# Patient Record
Sex: MALE | Race: WHITE | NOT HISPANIC OR LATINO | Employment: FULL TIME | URBAN - METROPOLITAN AREA
[De-identification: names, ages, dates, MRNs, and addresses within clinical notes are randomized per-mention and may not be internally consistent; named-entity substitution may affect disease eponyms.]

---

## 2018-02-06 ENCOUNTER — OFFICE VISIT (OUTPATIENT)
Dept: OBGYN CLINIC | Facility: MEDICAL CENTER | Age: 43
End: 2018-02-06
Payer: COMMERCIAL

## 2018-02-06 VITALS
BODY MASS INDEX: 23.79 KG/M2 | DIASTOLIC BLOOD PRESSURE: 89 MMHG | HEIGHT: 68 IN | WEIGHT: 157 LBS | SYSTOLIC BLOOD PRESSURE: 139 MMHG | HEART RATE: 88 BPM

## 2018-02-06 DIAGNOSIS — M25.511 RIGHT SHOULDER PAIN, UNSPECIFIED CHRONICITY: ICD-10-CM

## 2018-02-06 DIAGNOSIS — M54.12 RADICULOPATHY, CERVICAL REGION: Primary | ICD-10-CM

## 2018-02-06 PROCEDURE — 99203 OFFICE O/P NEW LOW 30 MIN: CPT | Performed by: ORTHOPAEDIC SURGERY

## 2018-02-06 RX ORDER — OMEPRAZOLE 40 MG/1
40 CAPSULE, DELAYED RELEASE ORAL DAILY
Refills: 5 | COMMUNITY
Start: 2018-01-23

## 2018-02-06 NOTE — PROGRESS NOTES
CHIEF COMPLAINT:   Chief Complaint   Patient presents with    Shoulder Pain       HISTORY: Leticia Davis is a 40 y o  male here for initial evaluation of right arm pain  He reports 1 year pain in the right arm  He reports a remote trauma 2 and half years ago but the pain from this injury resolved  However 1 year ago he woke up in the morning and was unable to lift his arm  He complains of pain in the scapular region and lateral arm as well as numbness and tingling in the lateral upper arm  No recent trauma  He was initially evaluated by [facility] where he was prescribed tramadol and recommended for 1 month follow-up but he did not return  Today he complains of continued pain in the scapular and lateral arm region with numbness and tingling in the lateral upper arm  ROS: Other than what is noted in the history of present illness 12 point review of systems was obtained and was otherwise negative       PROBLEMS:   Patient Active Problem List   Diagnosis    Radiculopathy, cervical region    Pain in right shoulder       MEDS:   Current Outpatient Prescriptions   Medication Sig Dispense Refill    omeprazole (PriLOSEC) 40 MG capsule Take 40 mg by mouth daily  5     No current facility-administered medications for this visit  ALLERGIES: Patient has no known allergies  MEDICAL HISTORY:   No past medical history on file  SURGICAL HISTORY:   No past surgical history on file  FAMILY HISTORY:   No family history on file  SOCIAL:   Social History     Social History    Marital status: /Civil Union     Spouse name: N/A    Number of children: N/A    Years of education: N/A     Occupational History    Not on file       Social History Main Topics    Smoking status: Not on file    Smokeless tobacco: Not on file    Alcohol use Not on file    Drug use: Unknown    Sexual activity: Not on file     Other Topics Concern    Not on file     Social History Narrative    No narrative on file The medications, allergies, and history as listed above were all reviewed by myself during this encounter      PHYSICAL EXAM:  Vitals:   Vitals:    02/06/18 1216   BP: 139/89   BP Location: Left arm   Patient Position: Sitting   Cuff Size: Standard   Pulse: 88   Weight: 71 2 kg (157 lb)   Height: 5' 8" (1 727 m)     Body mass index is 23 87 kg/m²  General:  Alert, no distress    ORTHO EXAM:  Neck:  No midline tenderness  Neck range of motion is full with minimal discomfort  Neurologic:  There are paresthesias in the C5 distribution on the right  Sensation is intact to light touch in C6 through T1 on the right and C5 through T1 on the left  Strength is 5/5 in C5 through T1 bilaterally  Right shoulder skin is intact  No tenderness  There is atrophy in the supraspinatus fossa  Active forward elevation is 170°, external rotation is 75°, internal rotation is to T10  Negative Neer and Helms impingement signs  Strength is 5/5 and elevation, supraspinatus, internal rotation, external rotation  Arm is warm and well perfused  IMAGING:  Three views the right shoulder and x-rays Of the cervical spine were available for review  These are available in the PACS system under patient id number [id number]  There is loss of normal cervical lordosis on the cervical spine  There are no acute bony abnormalities on either the shoulder or the cervical spine      ASSESSMENT AND PLAN:  Concern for cervical radiculopathy given the numbness and tingling as well as muscle atrophy  Recommend MRI of the cervical spine  However also given history of remote trauma with recommend MRI of the shoulder as well to rule out rotator cuff tear  Della Pam was seen today for shoulder pain      Diagnoses and all orders for this visit:    Radiculopathy, cervical region  -     MRI cervical spine wo contrast; Future    Right shoulder pain, unspecified chronicity  -     MRI shoulder right wo contrast; Future        There are no Patient Instructions on file for this visit  Return for After Tests Complete - Review results

## 2018-02-10 ENCOUNTER — HOSPITAL ENCOUNTER (OUTPATIENT)
Dept: MRI IMAGING | Facility: HOSPITAL | Age: 43
Discharge: HOME | End: 2018-02-10
Attending: ORTHOPAEDIC SURGERY
Payer: COMMERCIAL

## 2018-02-10 ENCOUNTER — APPOINTMENT (OUTPATIENT)
Dept: MRI IMAGING | Facility: HOSPITAL | Age: 43
End: 2018-02-10
Attending: ORTHOPAEDIC SURGERY
Payer: COMMERCIAL

## 2018-02-10 DIAGNOSIS — M54.12 RADICULOPATHY, CERVICAL REGION: ICD-10-CM

## 2018-02-10 PROCEDURE — 72141 MRI NECK SPINE W/O DYE: CPT

## 2018-02-16 ENCOUNTER — OFFICE VISIT (OUTPATIENT)
Dept: OBGYN CLINIC | Facility: CLINIC | Age: 43
End: 2018-02-16
Payer: COMMERCIAL

## 2018-02-16 VITALS
SYSTOLIC BLOOD PRESSURE: 131 MMHG | DIASTOLIC BLOOD PRESSURE: 86 MMHG | WEIGHT: 162 LBS | HEIGHT: 68 IN | BODY MASS INDEX: 24.55 KG/M2 | HEART RATE: 81 BPM

## 2018-02-16 DIAGNOSIS — M54.12 RADICULOPATHY, CERVICAL REGION: Primary | ICD-10-CM

## 2018-02-16 DIAGNOSIS — M25.511 RIGHT SHOULDER PAIN, UNSPECIFIED CHRONICITY: ICD-10-CM

## 2018-02-16 PROCEDURE — 99214 OFFICE O/P EST MOD 30 MIN: CPT | Performed by: ORTHOPAEDIC SURGERY

## 2018-02-16 NOTE — PATIENT INSTRUCTIONS
MRI shows a pinched nerve in the neck which is likely causing the pain, numbness, and muscle atrophy  Recommend referral to pain management for further management of the neck  We will also start physical therapy for the shoulder

## 2018-02-16 NOTE — PROGRESS NOTES
CHIEF COMPLAINT:   Chief Complaint   Patient presents with    Right Shoulder - Follow-up       HISTORY: Sree Mora is a 40 y o  male here for follow-up of right arm pain and MRI results  He continues to complain of pain in the right upper arm as well as numbness in the lateral aspect of the upper arm  No new injuries  ROS: Other than what is noted in the history of present illness 12 point review of systems was obtained and was otherwise negative      PROBLEMS:   Patient Active Problem List   Diagnosis    Radiculopathy, cervical region    Pain in right shoulder       MEDS:   Current Outpatient Prescriptions   Medication Sig Dispense Refill    omeprazole (PriLOSEC) 40 MG capsule Take 40 mg by mouth daily  5     No current facility-administered medications for this visit  ALLERGIES: Patient has no known allergies  MEDICAL HISTORY:   No past medical history on file  SOCIAL:   Social History     Social History    Marital status: /Civil Union     Spouse name: N/A    Number of children: N/A    Years of education: N/A     Occupational History    Not on file  Social History Main Topics    Smoking status: Not on file    Smokeless tobacco: Not on file    Alcohol use Not on file    Drug use: Unknown    Sexual activity: Not on file     Other Topics Concern    Not on file     Social History Narrative    No narrative on file       The medications, allergies, and history as listed above were all reviewed by myself during this encounter      PHYSICAL EXAM:  Vitals:   Vitals:    02/16/18 1529   BP: 131/86   Pulse: 81   Weight: 73 5 kg (162 lb)   Height: 5' 8" (1 727 m)     Body mass index is 24 63 kg/m²  General:  Alert, no distress    ORTHO EXAM:   Neurologic:  There are paresthesias in the C5 distribution on the right  Sensation is intact to light touch in C6 through T1 on the right and C5 through T1 on the left  Strength is 5/5 in C5 through T1 bilaterally    Right shoulder skin is intact  No tenderness  There is atrophy in the supraspinatus fossa  Active forward elevation is 170°, external rotation is 75°, internal rotation is to T10  Negative Neer and Helms impingement signs  Strength is 5/5 and elevation, supraspinatus, internal rotation, external rotation  Arm is warm and well perfused  IMAGING:  MRI was reviewed including imaging and report  Findings are as follows     IMPRESSION:      C4-5 and C5-6 right paracentral disc protrusions  Right lateral canal stenosis at both levels, more pronounced at the C4-5 level  Correlate for corresponding right-sided radiculopathy       No abnormal cord signal       ASSESSMENT AND PLAN:  Adelfa Lanes was seen today for follow-up  Diagnoses and all orders for this visit:    Radiculopathy, cervical region  -     Ambulatory referral to Pain Management; Future    Right shoulder pain, unspecified chronicity  -     Ambulatory referral to Physical Therapy; Future        Patient Instructions   MRI shows a pinched nerve in the neck which is likely causing the pain, numbness, and muscle atrophy  Recommend referral to pain management for further management of the neck  We will also start physical therapy for the shoulder  Return in about 5 weeks (around 3/23/2018)

## 2018-02-27 ENCOUNTER — EVALUATION (OUTPATIENT)
Dept: PHYSICAL THERAPY | Facility: MEDICAL CENTER | Age: 43
End: 2018-02-27
Payer: COMMERCIAL

## 2018-02-27 DIAGNOSIS — M54.12 RADICULOPATHY, CERVICAL REGION: Primary | ICD-10-CM

## 2018-02-27 DIAGNOSIS — M25.511 RIGHT SHOULDER PAIN, UNSPECIFIED CHRONICITY: ICD-10-CM

## 2018-02-27 PROCEDURE — 97161 PT EVAL LOW COMPLEX 20 MIN: CPT | Performed by: PHYSICAL THERAPIST

## 2018-02-27 PROCEDURE — G8990 OTHER PT/OT CURRENT STATUS: HCPCS | Performed by: PHYSICAL THERAPIST

## 2018-02-27 PROCEDURE — G8991 OTHER PT/OT GOAL STATUS: HCPCS | Performed by: PHYSICAL THERAPIST
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
## 2018-03-08 ENCOUNTER — OFFICE VISIT (OUTPATIENT)
Dept: PHYSICAL THERAPY | Facility: MEDICAL CENTER | Age: 43
End: 2018-03-08
Payer: COMMERCIAL

## 2018-03-08 DIAGNOSIS — M54.12 RADICULOPATHY, CERVICAL REGION: Primary | ICD-10-CM

## 2018-03-08 DIAGNOSIS — M25.511 RIGHT SHOULDER PAIN, UNSPECIFIED CHRONICITY: ICD-10-CM

## 2018-03-08 PROCEDURE — G8991 OTHER PT/OT GOAL STATUS: HCPCS | Performed by: PHYSICAL THERAPIST

## 2018-03-08 PROCEDURE — 97110 THERAPEUTIC EXERCISES: CPT | Performed by: PHYSICAL THERAPIST

## 2018-03-08 PROCEDURE — 97140 MANUAL THERAPY 1/> REGIONS: CPT | Performed by: PHYSICAL THERAPIST

## 2018-03-08 PROCEDURE — G8992 OTHER PT/OT  D/C STATUS: HCPCS | Performed by: PHYSICAL THERAPIST

## 2018-03-08 PROCEDURE — 97112 NEUROMUSCULAR REEDUCATION: CPT | Performed by: PHYSICAL THERAPIST

## 2018-03-08 NOTE — PROGRESS NOTES
Daily Note     Today's date: 3/8/2018  Patient name: Hakan Mccloud  : 1975  MRN: [id number]  Referring provider: Priyanka Pearson MD  Dx:   Encounter Diagnosis     ICD-10-CM    1  Radiculopathy, cervical region M54 12    2  Right shoulder pain, unspecified chronicity M25 511                   Subjective: Patient reports exercises are helping decrease pain and numbness  Objective: See treatment diary below    Precautions cervical disc protrusions and stenosis C4-5, C5-6     Specialty Daily Treatment Diary      Manual   3/8/18           IASTM right Ut, rhomboid  10'                                                                         Exercise Diary   3/8/18           pulleys  5 min           UT stretch to right  15s 5x           levation stretch to right  15s 5x           Repeated SB to right  2x10           Cervical isos  5s 10x            RTB rows  5" 20x            RTB ext  5" 20x                                                                                                                                                                                                       Modalities             prn                                                   Assessment: Tolerated treatment well  Patient exhibited good technique with therapeutic exercises and would benefit from continued PT      Plan: Continue per plan of care  Progress treatment as tolerated

## 2018-03-12 ENCOUNTER — APPOINTMENT (OUTPATIENT)
Dept: PHYSICAL THERAPY | Facility: MEDICAL CENTER | Age: 43
End: 2018-03-12
Payer: COMMERCIAL

## 2018-03-13 ENCOUNTER — APPOINTMENT (OUTPATIENT)
Dept: PHYSICAL THERAPY | Facility: MEDICAL CENTER | Age: 43
End: 2018-03-13
Payer: COMMERCIAL

## 2018-03-20 ENCOUNTER — OFFICE VISIT (OUTPATIENT)
Dept: PHYSICAL THERAPY | Facility: MEDICAL CENTER | Age: 43
End: 2018-03-20
Payer: COMMERCIAL

## 2018-03-27 ENCOUNTER — OFFICE VISIT (OUTPATIENT)
Dept: OBGYN CLINIC | Facility: MEDICAL CENTER | Age: 43
End: 2018-03-27
Payer: COMMERCIAL

## 2018-03-27 VITALS
SYSTOLIC BLOOD PRESSURE: 128 MMHG | HEIGHT: 68 IN | DIASTOLIC BLOOD PRESSURE: 79 MMHG | HEART RATE: 92 BPM | BODY MASS INDEX: 24.25 KG/M2 | WEIGHT: 160 LBS

## 2018-03-27 DIAGNOSIS — M54.12 RADICULOPATHY, CERVICAL REGION: Primary | ICD-10-CM

## 2018-03-27 DIAGNOSIS — M25.511 CHRONIC RIGHT SHOULDER PAIN: ICD-10-CM

## 2018-03-27 DIAGNOSIS — G89.29 CHRONIC RIGHT SHOULDER PAIN: ICD-10-CM

## 2018-03-27 PROCEDURE — 99213 OFFICE O/P EST LOW 20 MIN: CPT | Performed by: ORTHOPAEDIC SURGERY

## 2018-03-27 NOTE — PROGRESS NOTES
CHIEF COMPLAINT:   Chief Complaint   Patient presents with    Right Shoulder - Follow-up       HISTORY: Reji Shipley is a 44 y o  male here for follow-up of right arm pain  He has started physical therapy and notes improvement in his symptoms  He denies pain today  He no longer experiences any numbness or tingling  He feels that his strength is full  ROS: Other than what is noted in the history of present illness 12 point review of systems was obtained and was otherwise negative    PROBLEMS:   Patient Active Problem List   Diagnosis    Radiculopathy, cervical region    Pain in right shoulder       MEDS:   Current Outpatient Prescriptions   Medication Sig Dispense Refill    omeprazole (PriLOSEC) 40 MG capsule Take 40 mg by mouth daily  5     No current facility-administered medications for this visit  ALLERGIES: Patient has no known allergies  MEDICAL HISTORY:   No past medical history on file  SOCIAL:   Social History     Social History    Marital status: /Civil Union     Spouse name: N/A    Number of children: N/A    Years of education: N/A     Occupational History    Not on file  Social History Main Topics    Smoking status: Not on file    Smokeless tobacco: Not on file    Alcohol use Not on file    Drug use: Unknown    Sexual activity: Not on file     Other Topics Concern    Not on file     Social History Narrative    No narrative on file       The medications, allergies, and history as listed above were all reviewed by myself during this encounter      PHYSICAL EXAM:  Vitals:   Vitals:    03/27/18 1214   BP: 128/79   Pulse: 92   Weight: 72 6 kg (160 lb)   Height: 5' 8" (1 727 m)     Body mass index is 24 33 kg/m²      General:  Alert, no distress    ORTHO EXAM:   Neurologic:  There are paresthesias in the C5 distribution on the right   Sensation is intact to light touch in C6 through T1 on the right and C5 through T1 on the left   Strength is 5/5 in C5 through T1 bilaterally  Right shoulder skin is intact   No tenderness   There is atrophy in the supraspinatus fossa   Active forward elevation is 170°, external rotation is 75°, internal rotation is to T10   Negative Neer and Helms impingement signs  Strength is 5/5 and elevation, supraspinatus, internal rotation, external rotation  Arm is warm and well perfused  ASSESSMENT AND PLAN:  Carmelita Hough was seen today for follow-up  Diagnoses and all orders for this visit:    Radiculopathy, cervical region  -     Ambulatory referral to Physical Therapy; Future    Chronic right shoulder pain  -     Ambulatory referral to Physical Therapy; Future        Patient Instructions   We discussed options for treatment  As her symptoms are improving we can continue with more physical therapy  However if you start to notice recurrent weakness, numbness, tingling, or recurrent pain in the arm I recommend pursuing the referral to pain management talk about further treatment options  Return if symptoms worsen or fail to improve

## 2018-03-27 NOTE — PATIENT INSTRUCTIONS
We discussed options for treatment  As her symptoms are improving we can continue with more physical therapy  However if you start to notice recurrent weakness, numbness, tingling, or recurrent pain in the arm I recommend pursuing the referral to pain management talk about further treatment options

## 2024-09-16 ENCOUNTER — APPOINTMENT (OUTPATIENT)
Dept: URGENT CARE | Facility: CLINIC | Age: 49
End: 2024-09-16

## 2025-06-17 ENCOUNTER — OFFICE VISIT (OUTPATIENT)
Dept: FAMILY MEDICINE CLINIC | Facility: CLINIC | Age: 50
End: 2025-06-17
Payer: COMMERCIAL

## 2025-06-17 VITALS
TEMPERATURE: 98.3 F | HEIGHT: 67 IN | WEIGHT: 134 LBS | OXYGEN SATURATION: 98 % | SYSTOLIC BLOOD PRESSURE: 102 MMHG | RESPIRATION RATE: 16 BRPM | HEART RATE: 91 BPM | DIASTOLIC BLOOD PRESSURE: 78 MMHG | BODY MASS INDEX: 21.03 KG/M2

## 2025-06-17 DIAGNOSIS — F32.2 SEVERE MAJOR DEPRESSIVE DISORDER (HCC): ICD-10-CM

## 2025-06-17 DIAGNOSIS — Z13.220 SCREENING, LIPID: ICD-10-CM

## 2025-06-17 DIAGNOSIS — F33.1 MODERATE EPISODE OF RECURRENT MAJOR DEPRESSIVE DISORDER (HCC): ICD-10-CM

## 2025-06-17 DIAGNOSIS — Z00.00 ANNUAL PHYSICAL EXAM: ICD-10-CM

## 2025-06-17 DIAGNOSIS — K21.00 GASTROESOPHAGEAL REFLUX DISEASE WITH ESOPHAGITIS WITHOUT HEMORRHAGE: Primary | ICD-10-CM

## 2025-06-17 DIAGNOSIS — Z12.5 SCREENING FOR PROSTATE CANCER: ICD-10-CM

## 2025-06-17 PROBLEM — K21.9 GERD (GASTROESOPHAGEAL REFLUX DISEASE): Status: ACTIVE | Noted: 2025-06-17

## 2025-06-17 PROCEDURE — 99213 OFFICE O/P EST LOW 20 MIN: CPT | Performed by: INTERNAL MEDICINE

## 2025-06-17 PROCEDURE — 99386 PREV VISIT NEW AGE 40-64: CPT | Performed by: INTERNAL MEDICINE

## 2025-06-17 NOTE — ASSESSMENT & PLAN NOTE
Depression Screening Follow-up Plan: Patient's depression screening was positive with a PHQ-2 score of 3. Their PHQ-9 score was 13. Patient advised to follow-up with PCP for further management.    Orders:    sertraline (ZOLOFT) 50 mg tablet; Take 1 tablet (50 mg total) by mouth daily    Comprehensive metabolic panel; Future    TSH + Free T4; Future

## 2025-06-17 NOTE — PROGRESS NOTES
Adult Annual Physical  Name: Florentino Ramsay      : 1975      MRN: [id number]  Encounter Provider: Sigrid Gregory MD  Encounter Date: 2025   Encounter department: [facility]    :  Assessment & Plan  Gastroesophageal reflux disease with esophagitis without hemorrhage  Patient has had GERD for as long as he can remember and takes omeprazole on a regular basis he is seeing GI tomorrow  Orders:    CBC and differential; Future    Moderate episode of recurrent major depressive disorder (HCC)  Depression Screening Follow-up Plan: Patient's depression screening was positive with a PHQ-2 score of 3. Their PHQ-9 score was 13. Patient advised to follow-up with PCP for further management.    Orders:    sertraline (ZOLOFT) 50 mg tablet; Take 1 tablet (50 mg total) by mouth daily    Comprehensive metabolic panel; Future    TSH + Free T4; Future    Severe major depressive disorder (HCC)  Depression Screening Follow-up Plan: Patient's depression screening was positive with a PHQ-2 score of 3. Their PHQ-9 score was 13. Patient advised to follow-up with PCP for further management.  Will start patient on Zoloft for now and see him back in 1 month.  I told him it would take probably several weeks to feel at all different.  He has no suicidal ideation.  He       Screening for prostate cancer    Orders:    PSA, Total Screen; Future    Screening, lipid    Orders:    Lipid panel; Future    Annual physical exam             Preventive Screenings:  - Diabetes Screening: orders placed  - Cholesterol Screening: orders placed   - Hepatitis C screening: screening not indicated   - HIV screening: screening not indicated   - Colon cancer screening: orders placed   - Lung cancer screening: screening not indicated   - Prostate cancer screening: orders placed     Immunizations:  - Immunizations due: Tdap    Counseling/Anticipatory Guidance:  - Alcohol: discussed moderation in alcohol intake and recommendations for healthy alcohol  use.   - Dental health: discussed importance of regular tooth brushing, flossing, and dental visits.   - Exercise: the importance of regular exercise/physical activity was discussed. Recommend exercise 3-5 times per week for at least 30 minutes.   - Injury prevention: discussed safety/seat belts, safety helmets, smoke detectors, carbon monoxide detectors, and smoking near bedding or upholstery.       Depression Screening and Follow-up Plan: Patient's depression screening was positive with a PHQ-2 score of 3. Their PHQ-9 score was 13.   Patient assessed for underlying major depression. Brief counseling provided and recommend additional follow-up/re-evaluation next office visit. Patient advised to follow-up with PCP for further management.         History of Present Illness     Adult Annual Physical:  Patient presents for annual physical. Pt needs a doc an meds for depression.     Diet and Physical Activity:  - Diet/Nutrition: poor diet.  - Exercise: walking.    Depression Screening:  - PHQ-2 Score: 3  - PHQ-9 Score: 13    General Health:  - Sleep: sleeps poorly.  - Hearing: normal hearing bilateral ears.  - Vision: most recent eye exam < 1 year ago.  - Dental: regular dental visits.    /GYN Health:    - History of STDs: no     Health:  - History of STDs: no.     Advanced Care Planning:  - Has an advanced directive?: yes    - Has a durable medical POA?: yes      Review of Systems   Constitutional:  Positive for fatigue and unexpected weight change (lost weight). Negative for chills and fever.   HENT: Negative.  Negative for ear pain and sore throat.    Eyes: Negative.  Negative for pain and visual disturbance.   Respiratory: Negative.  Negative for cough and shortness of breath.    Cardiovascular: Negative.  Negative for chest pain and palpitations.   Gastrointestinal:  Positive for constipation. Negative for abdominal pain and vomiting.        GERD   Endocrine: Negative.    Genitourinary: Negative.  Negative for  "dysuria and hematuria.   Musculoskeletal: Negative.  Negative for arthralgias and back pain.   Skin: Negative.  Negative for color change and rash.   Allergic/Immunologic: Negative for immunocompromised state.   Neurological: Negative.  Negative for seizures and syncope.   Hematological: Negative.    Psychiatric/Behavioral:  Positive for dysphoric mood.    All other systems reviewed and are negative.        Objective   /78 (BP Location: Left arm, Patient Position: Sitting, Cuff Size: Large)   Pulse 91   Temp 98.3 °F (36.8 °C) (Temporal)   Resp 16   Ht 5' 6.75\" (1.695 m)   Wt 60.8 kg (134 lb)   SpO2 98%   BMI 21.14 kg/m²     Physical Exam  Constitutional:       Comments: He is a little underweight and he states he has always been skinny   HENT:      Head: Normocephalic and atraumatic.      Right Ear: Tympanic membrane normal.      Left Ear: Tympanic membrane normal.      Nose: Nose normal.      Mouth/Throat:      Mouth: Mucous membranes are moist.     Eyes:      Extraocular Movements: Extraocular movements intact.      Pupils: Pupils are equal, round, and reactive to light.     Neck:      Vascular: No carotid bruit.      Comments: No goiter  Cardiovascular:      Rate and Rhythm: Normal rate and regular rhythm.      Pulses: Normal pulses.   Pulmonary:      Effort: Pulmonary effort is normal.      Breath sounds: Normal breath sounds.   Abdominal:      General: Abdomen is flat.      Palpations: Abdomen is soft.     Musculoskeletal:         General: No swelling. Normal range of motion.      Right lower leg: No edema.      Left lower leg: No edema.   Lymphadenopathy:      Cervical: No cervical adenopathy.     Skin:     General: Skin is warm and dry.      Coloration: Skin is not jaundiced.      Findings: No rash.     Neurological:      General: No focal deficit present.      Mental Status: He is alert and oriented to person, place, and time.      Cranial Nerves: No cranial nerve deficit.      Sensory: No " sensory deficit.      Motor: No weakness.      Coordination: Coordination normal.      Gait: Gait normal.      Deep Tendon Reflexes: Reflexes normal.     Psychiatric:         Behavior: Behavior normal.         Thought Content: Thought content normal.         Judgment: Judgment normal.      Comments: Flat affect

## 2025-06-17 NOTE — ASSESSMENT & PLAN NOTE
Patient has had GERD for as long as he can remember and takes omeprazole on a regular basis he is seeing GI tomorrow  Orders:    CBC and differential; Future

## 2025-06-17 NOTE — ASSESSMENT & PLAN NOTE
Depression Screening Follow-up Plan: Patient's depression screening was positive with a PHQ-2 score of 3. Their PHQ-9 score was 13. Patient advised to follow-up with PCP for further management.  Will start patient on Zoloft for now and see him back in 1 month.  I told him it would take probably several weeks to feel at all different.  He has no suicidal ideation.  He

## 2025-06-18 ENCOUNTER — TELEPHONE (OUTPATIENT)
Age: 50
End: 2025-06-18

## 2025-06-18 ENCOUNTER — CONSULT (OUTPATIENT)
Age: 50
End: 2025-06-18
Payer: COMMERCIAL

## 2025-06-18 VITALS
HEIGHT: 67 IN | HEART RATE: 76 BPM | TEMPERATURE: 98.5 F | OXYGEN SATURATION: 98 % | BODY MASS INDEX: 21.35 KG/M2 | WEIGHT: 136 LBS

## 2025-06-18 DIAGNOSIS — Z12.11 SCREENING FOR COLON CANCER: ICD-10-CM

## 2025-06-18 DIAGNOSIS — K21.9 GASTROESOPHAGEAL REFLUX DISEASE, UNSPECIFIED WHETHER ESOPHAGITIS PRESENT: Primary | ICD-10-CM

## 2025-06-18 DIAGNOSIS — K62.5 RECTAL BLEEDING: ICD-10-CM

## 2025-06-18 DIAGNOSIS — K59.04 CHRONIC IDIOPATHIC CONSTIPATION: ICD-10-CM

## 2025-06-18 PROCEDURE — 99244 OFF/OP CNSLTJ NEW/EST MOD 40: CPT | Performed by: STUDENT IN AN ORGANIZED HEALTH CARE EDUCATION/TRAINING PROGRAM

## 2025-06-18 RX ORDER — POLYETHYLENE GLYCOL-3350 AND ELECTROLYTES 236; 6.74; 5.86; 2.97; 22.74 G/274.31G; G/274.31G; G/274.31G; G/274.31G; G/274.31G
4 POWDER, FOR SOLUTION ORAL ONCE
Qty: 4000 ML | Refills: 0 | Status: SHIPPED | OUTPATIENT
Start: 2025-06-18 | End: 2025-06-18

## 2025-06-18 RX ORDER — POLYETHYLENE GLYCOL 3350 17 G/17G
17 POWDER, FOR SOLUTION ORAL DAILY
Qty: 510 G | Refills: 1 | Status: SHIPPED | OUTPATIENT
Start: 2025-06-18

## 2025-06-18 RX ORDER — SODIUM CHLORIDE, SODIUM LACTATE, POTASSIUM CHLORIDE, CALCIUM CHLORIDE 600; 310; 30; 20 MG/100ML; MG/100ML; MG/100ML; MG/100ML
125 INJECTION, SOLUTION INTRAVENOUS CONTINUOUS
OUTPATIENT
Start: 2025-06-18

## 2025-06-18 NOTE — ASSESSMENT & PLAN NOTE
Likely has some degree of chronic idiopathic constipation.  Does not appear to be having significant abdominal pain making IBS less likely.  He may have some degree of slow transit.  I suspect that his constipation is exacerbating outlet bleeding.    Recommend MiraLAX 17 g daily  He may adjust dosing as needed to maintain healthy bowel habits  Would also recommend high-fiber diet and adequate hydration  Plan for colonoscopy as noted above for colon cancer screening    Orders:    polyethylene glycol (GLYCOLAX) 17 GM/SCOOP powder; Take 17 g by mouth daily

## 2025-06-18 NOTE — TELEPHONE ENCOUNTER
PA for (GLYCOLAX) 17 GM/SCOOP powder SUBMITTED to     via      [x][employer]    [x]PA sent as URGENT    All office notes, labs and other pertaining documents and studies sent. Clinical questions answered. Awaiting determination from insurance company.     Turnaround time for your insurance to make a decision on your Prior Authorization can take 7-21 business days.

## 2025-06-18 NOTE — ASSESSMENT & PLAN NOTE
Based on description, I suspect outlet bleeding possibly exacerbated by constipation.  I suspect that this is either hemorrhoidal or small anal fissure.  Other potential etiologies include polyp or neoplasm or ulcer or AVM or diverticular bleeding although these seem less likely.  He is due for colon cancer screening based on age.  No family history of colon cancer.  No prior colonoscopy.    We will schedule him for colonoscopy for evaluation  GaviLyte-G bowel prep    I obtained informed consent from the patient.  The risks/benefits/alternatives of the procedure were discussed with the patient.  Risks included, but not limited to, infection, bleeding, perforation, injury to organs in the abdomen, missed lesion and incomplete procedure were discussed.  Patient was agreeable and electronic signature was obtained.     Orders:    Colonoscopy; Future    polyethylene glycol (GaviLyte-G) 4000 mL solution; Take 4,000 mL by mouth once for 1 dose

## 2025-06-18 NOTE — ASSESSMENT & PLAN NOTE
Could have underlying hiatal hernia which could certainly contribute to chronic reflux symptoms.  There may be a functional component or less likely gastroparesis.  Could have underlying peptic ulcer disease or H. pylori infection as well.    Given the chronicity of his reflux, I would recommend EGD for evaluation and also for [place]'s screening  In the meantime, he may continue omeprazole 40 mg daily  He may take famotidine 20 mg daily at bedtime for any breakthrough symptoms or he may take antacids if needed    I obtained informed consent from the patient.  The risks/benefits/alternatives of the procedure were discussed with the patient.  Risks included, but not limited to, infection, bleeding, perforation, injury to organs in the abdomen, missed lesion and incomplete procedure were discussed.  Patient was agreeable and electronic signature was obtained.     Orders:    EGD; Future

## 2025-06-18 NOTE — PATIENT INSTRUCTIONS
We will schedule you for upper endoscopy (EGD) and colonoscopy  Please follow the instructions for the bowel prep  Continue the omeprazole 40 mg daily for now  You may take over-the-counter antacids for any breakthrough symptoms OR you can take famotidine 20 mg daily at bedtime which should also help with evening breakthrough symptoms  Start MiraLAX 17 gm daily  Make sure to drink plenty of water

## 2025-06-18 NOTE — PROGRESS NOTES
Name: Florentino Ramsay      : 1975      MRN: [id number]  Encounter Provider: Demetri Santo DO  Encounter Date: 2025   Encounter department: [facility]    :  Assessment & Plan  Gastroesophageal reflux disease, unspecified whether esophagitis present  Could have underlying hiatal hernia which could certainly contribute to chronic reflux symptoms.  There may be a functional component or less likely gastroparesis.  Could have underlying peptic ulcer disease or H. pylori infection as well.    Given the chronicity of his reflux, I would recommend EGD for evaluation and also for [place]'s screening  In the meantime, he may continue omeprazole 40 mg daily  He may take famotidine 20 mg daily at bedtime for any breakthrough symptoms or he may take antacids if needed    I obtained informed consent from the patient.  The risks/benefits/alternatives of the procedure were discussed with the patient.  Risks included, but not limited to, infection, bleeding, perforation, injury to organs in the abdomen, missed lesion and incomplete procedure were discussed.  Patient was agreeable and electronic signature was obtained.     Orders:    EGD; Future    Screening for colon cancer  Rectal bleeding  Based on description, I suspect outlet bleeding possibly exacerbated by constipation.  I suspect that this is either hemorrhoidal or small anal fissure.  Other potential etiologies include polyp or neoplasm or ulcer or AVM or diverticular bleeding although these seem less likely.  He is due for colon cancer screening based on age.  No family history of colon cancer.  No prior colonoscopy.    We will schedule him for colonoscopy for evaluation  GaviLyte-G bowel prep    I obtained informed consent from the patient.  The risks/benefits/alternatives of the procedure were discussed with the patient.  Risks included, but not limited to, infection, bleeding, perforation, injury to organs in the abdomen,  missed lesion and incomplete procedure were discussed.  Patient was agreeable and electronic signature was obtained.     Orders:    Colonoscopy; Future    polyethylene glycol (GaviLyte-G) 4000 mL solution; Take 4,000 mL by mouth once for 1 dose    Chronic idiopathic constipation  Likely has some degree of chronic idiopathic constipation.  Does not appear to be having significant abdominal pain making IBS less likely.  He may have some degree of slow transit.  I suspect that his constipation is exacerbating outlet bleeding.    Recommend MiraLAX 17 g daily  He may adjust dosing as needed to maintain healthy bowel habits  Would also recommend high-fiber diet and adequate hydration  Plan for colonoscopy as noted above for colon cancer screening    Orders:    polyethylene glycol (GLYCOLAX) 17 GM/SCOOP powder; Take 17 g by mouth daily      History of Present Illness  The patient is a 50-year-old male who presents to the GI office for evaluation of chronic GERD, constipation, and the need for colon cancer screening. He is new to this office and has been referred by his PCP.    He reports a longstanding history of reflux for most of his life. His last endoscopy was over 10 years ago, but the report is not available for review. He has been on omeprazole chronically for several years, although there was a brief period where he was off the medication and doing well. However, he experienced a recurrence of symptoms and used over-the-counter famotidine for about a year before noticing a worsening of symptoms. He resumed omeprazole once daily and continues its use. Despite this, he experiences some breakthrough symptoms, typically in the evening, for which he takes over-the-counter antacids as needed. He reports a sensation of fullness and occasionally wakes up feeling as if food is still in his stomach. He does not experience significant dysphagia or odynophagia but recalls undergoing a dilation procedure during a previous  "endoscopy.    He also reports constipation and rectal bleeding. He has bowel movements once or twice a week and often has to strain. He first noticed the bleeding about 2 months ago, which had resolved for a period but has recurred over the past couple of weeks. He reports the blood as bright red and mixed with stool. He reports no hematochezia or melena. He has tried over-the-counter laxatives, including MiraLAX, but reports inconsistent use, taking it only as needed.    FAMILY HISTORY  - Negative for colon cancer    History obtained from: patient    Review of Systems   Constitutional:  Negative for activity change, appetite change and unexpected weight change.   HENT:  Negative for trouble swallowing.    Respiratory:  Negative for shortness of breath.    Cardiovascular:  Negative for chest pain.   Gastrointestinal:  Positive for blood in stool and constipation. Negative for abdominal distention, abdominal pain, diarrhea, nausea, rectal pain and vomiting.        Heartburn, reflux   Skin:  Negative for color change, rash and wound.     Medical History Reviewed by provider this encounter:     .  Medications Ordered Prior to Encounter[1]   Social History[2]     Objective   Pulse 76   Temp 98.5 °F (36.9 °C) (Temporal)   Ht 5' 6.75\" (1.695 m)   Wt 61.7 kg (136 lb)   SpO2 98%   BMI 21.46 kg/m²      Physical Exam  Heart Rate: Normal  General: Patient appears appropriate for age, no apparent distress.  Heart: Regular rhythm  Lungs: Clear bilaterally, no respiratory distress  Abdomen: Soft, nontender, nondistended, with audible bowel sounds  Extremities: No lower extremity edema       Results          Administrative Statements   I have spent a total time of 20 minutes in caring for this patient on the day of the visit/encounter including Diagnostic results, Prognosis, Risks and benefits of tx options, Instructions for management, Patient and family education, Importance of tx compliance, Risk factor reductions, " Impressions, Counseling / Coordination of care, Documenting in the medical record, Reviewing/placing orders in the medical record (including tests, medications, and/or procedures), and Obtaining or reviewing history  .      Demetri Santo D.O.  [facility]  Division of Gastroenterology & Hepatology  Available on [employer]  [email or web address]    ** Please Note: This note is constructed using artificial intelligence and a voice recognition dictation system. **       [1]   Current Outpatient Medications on File Prior to Visit   Medication Sig Dispense Refill    omeprazole (PriLOSEC) 40 MG capsule Take 40 mg by mouth in the morning.  5    sertraline (ZOLOFT) 50 mg tablet Take 1 tablet (50 mg total) by mouth daily 30 tablet 5     No current facility-administered medications on file prior to visit.   [2]   Social History  Tobacco Use    Smoking status: Former     Average packs/day: 1 pack/day for 10.0 years (10.0 ttl pk-yrs)     Types: Cigarettes     Start date: 1996    Smokeless tobacco: Never   Vaping Use    Vaping status: Never Used   Substance and Sexual Activity    Alcohol use: Yes     Comment: occ    Drug use: Never

## 2025-06-19 ENCOUNTER — APPOINTMENT (OUTPATIENT)
Dept: LAB | Facility: MEDICAL CENTER | Age: 50
End: 2025-06-19
Payer: COMMERCIAL

## 2025-06-19 DIAGNOSIS — K21.00 GASTROESOPHAGEAL REFLUX DISEASE WITH ESOPHAGITIS WITHOUT HEMORRHAGE: ICD-10-CM

## 2025-06-19 DIAGNOSIS — Z12.5 SCREENING FOR PROSTATE CANCER: ICD-10-CM

## 2025-06-19 DIAGNOSIS — Z13.220 SCREENING, LIPID: ICD-10-CM

## 2025-06-19 DIAGNOSIS — F33.1 MODERATE EPISODE OF RECURRENT MAJOR DEPRESSIVE DISORDER (HCC): ICD-10-CM

## 2025-06-19 LAB
ALBUMIN SERPL BCG-MCNC: 4.2 G/DL (ref 3.5–5)
ALP SERPL-CCNC: 88 U/L (ref 34–104)
ALT SERPL W P-5'-P-CCNC: 22 U/L (ref 7–52)
ANION GAP SERPL CALCULATED.3IONS-SCNC: 8 MMOL/L (ref 4–13)
AST SERPL W P-5'-P-CCNC: 27 U/L (ref 13–39)
BASOPHILS # BLD AUTO: 0.05 THOUSANDS/ÂΜL (ref 0–0.1)
BASOPHILS NFR BLD AUTO: 1 % (ref 0–1)
BILIRUB SERPL-MCNC: 0.4 MG/DL (ref 0.2–1)
BUN SERPL-MCNC: 9 MG/DL (ref 5–25)
CALCIUM SERPL-MCNC: 9 MG/DL (ref 8.4–10.2)
CHLORIDE SERPL-SCNC: 102 MMOL/L (ref 96–108)
CHOLEST SERPL-MCNC: 151 MG/DL (ref ?–200)
CO2 SERPL-SCNC: 27 MMOL/L (ref 21–32)
CREAT SERPL-MCNC: 0.8 MG/DL (ref 0.6–1.3)
EOSINOPHIL # BLD AUTO: 0.39 THOUSAND/ÂΜL (ref 0–0.61)
EOSINOPHIL NFR BLD AUTO: 7 % (ref 0–6)
ERYTHROCYTE [DISTWIDTH] IN BLOOD BY AUTOMATED COUNT: 13.3 % (ref 11.6–15.1)
GFR SERPL CREATININE-BSD FRML MDRD: 104 ML/MIN/1.73SQ M
GLUCOSE P FAST SERPL-MCNC: 98 MG/DL (ref 65–99)
HCT VFR BLD AUTO: 43.4 % (ref 36.5–49.3)
HDLC SERPL-MCNC: 46 MG/DL
HGB BLD-MCNC: 14.1 G/DL (ref 12–17)
IMM GRANULOCYTES # BLD AUTO: 0.02 THOUSAND/UL (ref 0–0.2)
IMM GRANULOCYTES NFR BLD AUTO: 0 % (ref 0–2)
LDLC SERPL CALC-MCNC: 90 MG/DL (ref 0–100)
LYMPHOCYTES # BLD AUTO: 2.13 THOUSANDS/ÂΜL (ref 0.6–4.47)
LYMPHOCYTES NFR BLD AUTO: 36 % (ref 14–44)
MCH RBC QN AUTO: 29.8 PG (ref 26.8–34.3)
MCHC RBC AUTO-ENTMCNC: 32.5 G/DL (ref 31.4–37.4)
MCV RBC AUTO: 92 FL (ref 82–98)
MONOCYTES # BLD AUTO: 0.54 THOUSAND/ÂΜL (ref 0.17–1.22)
MONOCYTES NFR BLD AUTO: 9 % (ref 4–12)
NEUTROPHILS # BLD AUTO: 2.72 THOUSANDS/ÂΜL (ref 1.85–7.62)
NEUTS SEG NFR BLD AUTO: 47 % (ref 43–75)
NONHDLC SERPL-MCNC: 105 MG/DL
NRBC BLD AUTO-RTO: 0 /100 WBCS
PLATELET # BLD AUTO: 263 THOUSANDS/UL (ref 149–390)
PMV BLD AUTO: 10.2 FL (ref 8.9–12.7)
POTASSIUM SERPL-SCNC: 4.8 MMOL/L (ref 3.5–5.3)
PROT SERPL-MCNC: 6.6 G/DL (ref 6.4–8.4)
PSA SERPL-MCNC: 0.3 NG/ML (ref 0–4)
RBC # BLD AUTO: 4.73 MILLION/UL (ref 3.88–5.62)
SODIUM SERPL-SCNC: 137 MMOL/L (ref 135–147)
T4 FREE SERPL-MCNC: 0.64 NG/DL (ref 0.61–1.12)
TRIGL SERPL-MCNC: 75 MG/DL (ref ?–150)
TSH SERPL DL<=0.05 MIU/L-ACNC: 0.94 UIU/ML (ref 0.45–4.5)
WBC # BLD AUTO: 5.85 THOUSAND/UL (ref 4.31–10.16)

## 2025-06-19 PROCEDURE — G0103 PSA SCREENING: HCPCS

## 2025-06-19 PROCEDURE — 85025 COMPLETE CBC W/AUTO DIFF WBC: CPT

## 2025-06-19 PROCEDURE — 80053 COMPREHEN METABOLIC PANEL: CPT

## 2025-06-19 PROCEDURE — 84439 ASSAY OF FREE THYROXINE: CPT

## 2025-06-19 PROCEDURE — 84443 ASSAY THYROID STIM HORMONE: CPT

## 2025-06-19 PROCEDURE — 80061 LIPID PANEL: CPT

## 2025-06-19 PROCEDURE — 36415 COLL VENOUS BLD VENIPUNCTURE: CPT

## 2025-06-19 NOTE — TELEPHONE ENCOUNTER
PA for Golyol powder DENIED    Reason:(Screenshot if applicable)        Message sent to office clinical pool Yes    Denial letter scanned into Media Yes      **Please follow up with your patient regarding denial and next steps**

## 2025-06-20 ENCOUNTER — RESULTS FOLLOW-UP (OUTPATIENT)
Dept: FAMILY MEDICINE CLINIC | Facility: CLINIC | Age: 50
End: 2025-06-20

## 2025-07-01 ENCOUNTER — ANESTHESIA (OUTPATIENT)
Dept: GASTROENTEROLOGY | Facility: HOSPITAL | Age: 50
End: 2025-07-01
Payer: COMMERCIAL

## 2025-07-01 ENCOUNTER — ANESTHESIA EVENT (OUTPATIENT)
Dept: GASTROENTEROLOGY | Facility: HOSPITAL | Age: 50
End: 2025-07-01
Payer: COMMERCIAL

## 2025-07-01 ENCOUNTER — HOSPITAL ENCOUNTER (OUTPATIENT)
Dept: GASTROENTEROLOGY | Facility: HOSPITAL | Age: 50
Setting detail: OUTPATIENT SURGERY
Discharge: HOME | End: 2025-07-01
Attending: STUDENT IN AN ORGANIZED HEALTH CARE EDUCATION/TRAINING PROGRAM
Payer: COMMERCIAL

## 2025-07-01 VITALS
RESPIRATION RATE: 16 BRPM | TEMPERATURE: 97.8 F | HEART RATE: 61 BPM | SYSTOLIC BLOOD PRESSURE: 106 MMHG | OXYGEN SATURATION: 98 % | DIASTOLIC BLOOD PRESSURE: 65 MMHG

## 2025-07-01 DIAGNOSIS — K21.9 GASTROESOPHAGEAL REFLUX DISEASE, UNSPECIFIED WHETHER ESOPHAGITIS PRESENT: ICD-10-CM

## 2025-07-01 DIAGNOSIS — K31.5 DUODENAL STRICTURE: Primary | ICD-10-CM

## 2025-07-01 DIAGNOSIS — Z12.11 SCREENING FOR COLON CANCER: ICD-10-CM

## 2025-07-01 PROCEDURE — 88305 TISSUE EXAM BY PATHOLOGIST: CPT | Performed by: PATHOLOGY

## 2025-07-01 PROCEDURE — C1726 CATH, BAL DIL, NON-VASCULAR: HCPCS

## 2025-07-01 RX ORDER — SODIUM CHLORIDE, SODIUM LACTATE, POTASSIUM CHLORIDE, CALCIUM CHLORIDE 600; 310; 30; 20 MG/100ML; MG/100ML; MG/100ML; MG/100ML
125 INJECTION, SOLUTION INTRAVENOUS CONTINUOUS
Status: DISCONTINUED | OUTPATIENT
Start: 2025-07-01 | End: 2025-07-05 | Stop reason: HOSPADM

## 2025-07-01 RX ORDER — SODIUM CHLORIDE, SODIUM LACTATE, POTASSIUM CHLORIDE, CALCIUM CHLORIDE 600; 310; 30; 20 MG/100ML; MG/100ML; MG/100ML; MG/100ML
INJECTION, SOLUTION INTRAVENOUS CONTINUOUS PRN
Status: DISCONTINUED | OUTPATIENT
Start: 2025-07-01 | End: 2025-07-01

## 2025-07-01 RX ORDER — PROPOFOL 10 MG/ML
INJECTION, EMULSION INTRAVENOUS AS NEEDED
Status: DISCONTINUED | OUTPATIENT
Start: 2025-07-01 | End: 2025-07-01

## 2025-07-01 RX ORDER — LIDOCAINE HYDROCHLORIDE 20 MG/ML
INJECTION, SOLUTION EPIDURAL; INFILTRATION; INTRACAUDAL; PERINEURAL AS NEEDED
Status: DISCONTINUED | OUTPATIENT
Start: 2025-07-01 | End: 2025-07-01

## 2025-07-01 RX ORDER — OMEPRAZOLE 40 MG/1
40 CAPSULE, DELAYED RELEASE ORAL
Qty: 60 CAPSULE | Refills: 2 | Status: SHIPPED | OUTPATIENT
Start: 2025-07-01

## 2025-07-01 RX ADMIN — PROPOFOL 50 MG: 10 INJECTION, EMULSION INTRAVENOUS at 07:58

## 2025-07-01 RX ADMIN — PROPOFOL 50 MG: 10 INJECTION, EMULSION INTRAVENOUS at 07:38

## 2025-07-01 RX ADMIN — PROPOFOL 50 MG: 10 INJECTION, EMULSION INTRAVENOUS at 07:52

## 2025-07-01 RX ADMIN — SODIUM CHLORIDE, SODIUM LACTATE, POTASSIUM CHLORIDE, AND CALCIUM CHLORIDE 125 ML/HR: .6; .31; .03; .02 INJECTION, SOLUTION INTRAVENOUS at 06:41

## 2025-07-01 RX ADMIN — PROPOFOL 150 MG: 10 INJECTION, EMULSION INTRAVENOUS at 07:31

## 2025-07-01 RX ADMIN — PROPOFOL 50 MG: 10 INJECTION, EMULSION INTRAVENOUS at 07:33

## 2025-07-01 RX ADMIN — PROPOFOL 50 MG: 10 INJECTION, EMULSION INTRAVENOUS at 07:42

## 2025-07-01 RX ADMIN — PROPOFOL 50 MG: 10 INJECTION, EMULSION INTRAVENOUS at 08:07

## 2025-07-01 RX ADMIN — LIDOCAINE HYDROCHLORIDE 100 MG: 20 INJECTION, SOLUTION EPIDURAL; INFILTRATION; INTRACAUDAL at 07:31

## 2025-07-01 RX ADMIN — Medication 40 MG: at 07:57

## 2025-07-01 RX ADMIN — PROPOFOL 50 MG: 10 INJECTION, EMULSION INTRAVENOUS at 07:47

## 2025-07-01 RX ADMIN — PROPOFOL 50 MG: 10 INJECTION, EMULSION INTRAVENOUS at 07:35

## 2025-07-01 RX ADMIN — SODIUM CHLORIDE, SODIUM LACTATE, POTASSIUM CHLORIDE, AND CALCIUM CHLORIDE: .6; .31; .03; .02 INJECTION, SOLUTION INTRAVENOUS at 07:26

## 2025-07-01 NOTE — ANESTHESIA PREPROCEDURE EVALUATION
Procedure:  COLONOSCOPY  EGD    Relevant Problems   GI/HEPATIC   (+) GERD (gastroesophageal reflux disease)   (+) Rectal bleeding      NEURO/PSYCH   (+) Moderate episode of recurrent major depressive disorder (HCC)        Physical Exam    Airway     Mallampati score: II  TM Distance: >3 FB  Neck ROM: full      Cardiovascular  Rhythm: regularCardiovascular exam normal    Dental       Pulmonary  Pulmonary exam normal     Neurological      Other Findings        Anesthesia Plan  ASA Score- 2     Anesthesia Type- IV sedation with anesthesia with ASA Monitors.         Additional Monitors:     Airway Plan:            Plan Factors-Exercise tolerance (METS): >4 METS.    Chart reviewed. EKG reviewed. Imaging results reviewed. Existing labs reviewed. Patient summary reviewed.    Patient is not a current smoker.              Induction- intravenous.    Postoperative Plan- .   Monitoring Plan - Monitoring plan - standard ASA monitoring          Informed Consent- Anesthetic plan and risks discussed with patient.  I personally reviewed this patient with the CRNA. Discussed and agreed on the Anesthesia Plan with the CRNA..      NPO Status:  Vitals Value Taken Time   Date of last liquid 07/01/25 07/01/25 06:34   Time of last liquid 0100 07/01/25 06:34   Date of last solid 06/29/25 07/01/25 06:34   Time of last solid 1800 07/01/25 06:34

## 2025-07-01 NOTE — ANESTHESIA POSTPROCEDURE EVALUATION
Post-Op Assessment Note    CV Status:  Stable  Pain Score: 0    Pain management: adequate       Mental Status:  Sleepy and arousable   Hydration Status:  Euvolemic   PONV Controlled:  None   Airway Patency:  Patent     Post Op Vitals Reviewed: Yes    No anethesia notable event occurred.    Staff: CRNA           Last Filed PACU Vitals:  Vitals Value Taken Time   Temp     Pulse 68    /63    Resp 16    SpO2 98

## 2025-07-01 NOTE — H&P
[facility]  History & Physical     PATIENT INFO     Name: Florentino Ramsay  YOB: 1975   Age: 50 y.o.   Sex: male   MRN: [id number]     HISTORY OF PRESENT ILLNESS     Florentino Ramsay is a 50 y.o. year old male who presents for EGD and colonoscopy for GERD and colon cancer screening. No prior colonoscopy. No antithrombotics or anticoagulants.     REVIEW OF SYSTEMS     Per the HPI, and otherwise unremarkable.    Historical Information   Past Medical History[1]  Past Surgical History[2]  Social History   Social History     Substance and Sexual Activity   Alcohol Use Yes    Comment: occ     Social History     Substance and Sexual Activity   Drug Use Never     Tobacco Use History[3]  Family History[4]     MEDICATIONS & ALLERGIES     Current Outpatient Medications   Medication Instructions    omeprazole (PRILOSEC) 40 mg, Daily    polyethylene glycol (GaviLyte-G) 4000 mL solution 4,000 mL, Oral, Once    polyethylene glycol (GLYCOLAX) 17 g, Oral, Daily    sertraline (ZOLOFT) 50 mg, Oral, Daily     Allergies[5]     PHYSICAL EXAM      Objective   Blood pressure 126/84, pulse 74, temperature 98.5 °F (36.9 °C), temperature source Temporal, resp. rate 18, SpO2 100%. There is no height or weight on file to calculate BMI.    General Appearance:   Alert, cooperative, no distress   Lungs:   Equal chest rise, respirations unlabored    Heart:   Regular rate and rhythm   Abdomen:   Soft, non-tender, non-distended; normal bowel sounds; no masses, no organomegaly    Extremities:   No edema       ASSESSMENT & PLAN     This is a 50 y.o. year old male here for EGD and colonoscopy, and he is stable and optimized for his procedure.      Demetri Santo D.O.  [facility]  Division of Gastroenterology & Hepatology  Available on [employer]  [email or web address]    ** Please Note: This note is constructed using a voice recognition dictation system. **       [1]   Past Medical History:  Diagnosis  Date    GERD (gastroesophageal reflux disease)    [2] No past surgical history on file.  [3]   Social History  Tobacco Use   Smoking Status Former    Average packs/day: 1 pack/day for 10.0 years (10.0 ttl pk-yrs)    Types: Cigarettes    Start date: 1996   Smokeless Tobacco Never   [4]   Family History  Problem Relation Name Age of Onset    No Known Problems Mother      Cancer Father          skin ca   [5] No Known Allergies

## 2025-07-07 PROCEDURE — 88305 TISSUE EXAM BY PATHOLOGIST: CPT | Performed by: PATHOLOGY

## 2025-07-15 ENCOUNTER — HOSPITAL ENCOUNTER (OUTPATIENT)
Dept: RADIOLOGY | Facility: MEDICAL CENTER | Age: 50
Discharge: HOME | End: 2025-07-15
Attending: STUDENT IN AN ORGANIZED HEALTH CARE EDUCATION/TRAINING PROGRAM
Payer: COMMERCIAL

## 2025-07-15 DIAGNOSIS — K31.5 DUODENAL STRICTURE: ICD-10-CM

## 2025-07-15 PROCEDURE — 74177 CT ABD & PELVIS W/CONTRAST: CPT

## 2025-07-15 RX ADMIN — IOHEXOL 85 ML: 350 INJECTION, SOLUTION INTRAVENOUS at 10:42

## 2025-07-18 PROBLEM — Z12.11 SCREENING FOR COLON CANCER: Status: RESOLVED | Noted: 2025-06-18 | Resolved: 2025-07-18

## 2025-07-22 ENCOUNTER — OFFICE VISIT (OUTPATIENT)
Dept: FAMILY MEDICINE CLINIC | Facility: CLINIC | Age: 50
End: 2025-07-22
Payer: COMMERCIAL

## 2025-07-22 VITALS
SYSTOLIC BLOOD PRESSURE: 122 MMHG | WEIGHT: 140 LBS | OXYGEN SATURATION: 99 % | HEART RATE: 87 BPM | TEMPERATURE: 98.2 F | HEIGHT: 66 IN | BODY MASS INDEX: 22.5 KG/M2 | DIASTOLIC BLOOD PRESSURE: 76 MMHG | RESPIRATION RATE: 16 BRPM

## 2025-07-22 DIAGNOSIS — K62.5 RECTAL BLEEDING: ICD-10-CM

## 2025-07-22 DIAGNOSIS — F33.1 MODERATE EPISODE OF RECURRENT MAJOR DEPRESSIVE DISORDER (HCC): ICD-10-CM

## 2025-07-22 DIAGNOSIS — K21.00 GASTROESOPHAGEAL REFLUX DISEASE WITH ESOPHAGITIS WITHOUT HEMORRHAGE: Primary | ICD-10-CM

## 2025-07-22 PROCEDURE — 99214 OFFICE O/P EST MOD 30 MIN: CPT | Performed by: INTERNAL MEDICINE

## 2025-07-22 RX ORDER — SERTRALINE HYDROCHLORIDE 100 MG/1
100 TABLET, FILM COATED ORAL DAILY
Qty: 90 TABLET | Refills: 3 | Status: SHIPPED | OUTPATIENT
Start: 2025-07-22

## 2025-07-22 NOTE — ASSESSMENT & PLAN NOTE
He himself increased his Zoloft to 100 mg a day    Orders:    sertraline (ZOLOFT) 100 mg tablet; Take 1 tablet (100 mg total) by mouth daily

## 2025-07-22 NOTE — ASSESSMENT & PLAN NOTE
Patient continues to have some inflammation of his esophagus and duodenum and they believe he may have a stricture.  He may need surgery

## 2025-07-22 NOTE — PROGRESS NOTES
"Name: Florentino Ramsay      : 1975      MRN: [id number]  Encounter Provider: Sigrid Gregory MD  Encounter Date: 2025   Encounter department: [facility]  :  Assessment & Plan  Rectal bleeding  He had colonoscopy without any significant abnormalities       Gastroesophageal reflux disease with esophagitis without hemorrhage  Patient continues to have some inflammation of his esophagus and duodenum and they believe he may have a stricture.  He may need surgery       Moderate episode of recurrent major depressive disorder (HCC)  He himself increased his Zoloft to 100 mg a day    Orders:    sertraline (ZOLOFT) 100 mg tablet; Take 1 tablet (100 mg total) by mouth daily           History of Present Illness   He feels okay except he is tired all the time.  He probably should have sleep study in the future      Review of Systems   Constitutional:  Positive for fatigue.   Eyes: Negative.    Respiratory: Negative.     Cardiovascular: Negative.    Gastrointestinal:  Positive for constipation.   Endocrine: Negative.    Genitourinary: Negative.    Skin: Negative.    Allergic/Immunologic: Negative.    Neurological: Negative.    Hematological: Negative.        Objective   /76 (BP Location: Right arm, Patient Position: Sitting, Cuff Size: Standard)   Pulse 87   Temp 98.2 °F (36.8 °C) (Temporal)   Resp 16   Ht 5' 6\" (1.676 m)   Wt 63.5 kg (140 lb)   SpO2 99%   BMI 22.60 kg/m²      Physical Exam  Vitals and nursing note reviewed.   Constitutional:       General: He is not in acute distress.     Appearance: Normal appearance. He is well-developed.   HENT:      Head: Normocephalic and atraumatic.     Eyes:      Conjunctiva/sclera: Conjunctivae normal.       Cardiovascular:      Rate and Rhythm: Normal rate and regular rhythm.      Heart sounds: No murmur heard.  Pulmonary:      Effort: Pulmonary effort is normal. No respiratory distress.      Breath sounds: Normal breath sounds.   Abdominal:      " Palpations: Abdomen is soft.      Tenderness: There is no abdominal tenderness.     Musculoskeletal:         General: No swelling.      Cervical back: Neck supple.     Skin:     General: Skin is warm and dry.      Capillary Refill: Capillary refill takes less than 2 seconds.     Neurological:      Mental Status: He is alert.     Psychiatric:         Mood and Affect: Mood normal.

## 2025-07-28 ENCOUNTER — PREP FOR PROCEDURE (OUTPATIENT)
Age: 50
End: 2025-07-28

## 2025-07-28 DIAGNOSIS — K31.5 DUODENAL STRICTURE: Primary | ICD-10-CM

## 2025-07-28 RX ORDER — SODIUM CHLORIDE, SODIUM LACTATE, POTASSIUM CHLORIDE, CALCIUM CHLORIDE 600; 310; 30; 20 MG/100ML; MG/100ML; MG/100ML; MG/100ML
125 INJECTION, SOLUTION INTRAVENOUS CONTINUOUS
OUTPATIENT
Start: 2025-07-28

## 2025-08-15 ENCOUNTER — ANESTHESIA (OUTPATIENT)
Dept: GASTROENTEROLOGY | Facility: HOSPITAL | Age: 50
End: 2025-08-15
Payer: COMMERCIAL

## 2025-08-15 ENCOUNTER — PREP FOR PROCEDURE (OUTPATIENT)
Age: 50
End: 2025-08-15

## 2025-08-15 ENCOUNTER — ANESTHESIA EVENT (OUTPATIENT)
Dept: GASTROENTEROLOGY | Facility: HOSPITAL | Age: 50
End: 2025-08-15
Payer: COMMERCIAL

## 2025-08-15 ENCOUNTER — HOSPITAL ENCOUNTER (OUTPATIENT)
Dept: GASTROENTEROLOGY | Facility: HOSPITAL | Age: 50
Setting detail: OUTPATIENT SURGERY
Discharge: HOME | End: 2025-08-15
Attending: STUDENT IN AN ORGANIZED HEALTH CARE EDUCATION/TRAINING PROGRAM | Admitting: STUDENT IN AN ORGANIZED HEALTH CARE EDUCATION/TRAINING PROGRAM
Payer: COMMERCIAL

## 2025-08-20 ENCOUNTER — TELEPHONE (OUTPATIENT)
Dept: GASTROENTEROLOGY | Facility: MEDICAL CENTER | Age: 50
End: 2025-08-20